# Patient Record
(demographics unavailable — no encounter records)

---

## 2024-10-07 NOTE — PLAN
[FreeTextEntry1] : 1. sickle cell anemia * referral for lab CBC * c/w folic acid 2. hypercholesterolemia  * referral for lab fasting cmp, lipids * low cholesterol, low triglycerides diet, dietary counseling given; dietary avoidance discussed; diet and exercise reviewed with patient * c/w rosuvastatin 20 mg 3. obesity * patient counselled on healthy diet with limited calories, regular exercises, low fat choices, limit or eliminate junk food, and to have family meals as often as possible. * c/w phentermine * f/u in three months

## 2024-10-07 NOTE — HISTORY OF PRESENT ILLNESS
[Home] : at home, [unfilled] , at the time of the visit. [Medical Office: ([facility])___] : at the medical office located in  [Verbal consent obtained from patient] : the patient, [unfilled] [FreeTextEntry1] : follow up Interview and discussion conducted in Danish by Danish speaking physician [de-identified] : Telehealth visit was delivered after patient consent was obtained; patient agrees to discuss health issues via video conference. Risk/limitations involved in medical telehealth visits were reviewed, including limitations of physical exam. Patient was asked for identifiers by stating name and ; patient was asked to place him/her self in private area. Patient understand that an office visit may be indicated after telehealth visit if no symptomatic improvement. Patient should contact us for worsening symptoms or go to ER. 38 years old female with sickle cell trait, hypercholesterolemia; obesity, seen for follow up, she states feeling better, joints pain has improved, feels with more energy; taking phentermine for weight losss, and rosuvastatin; she is due for labs to monitor lipids, cbc; time spent 30 minutes

## 2025-04-28 NOTE — HISTORY OF PRESENT ILLNESS
[de-identified] : 39 years old female presents for annual physical exam, refers multiple joints and muscle pain

## 2025-04-28 NOTE — PLAN
[FreeTextEntry1] : 1. annual physical exam * routine general labs done * age appropriate health maintenance recommendations reviewed, discussed including healthy diet, regular exercise 2. overweight * patient counselled on healthy diet with limited calories, regular exercises, low fat choices, limit or eliminate junk food, and to have family meals as often as possible. 3. hypercholesterolemia * low cholesterol, low triglycerides diet, dietary counseling given; dietary avoidance discussed; diet and exercise reviewed with patient 4. sickle cell trait * c/w folic acid * referral to hematology 5. muscle pain * acetaminophen as needed * f/u in three months

## 2025-04-28 NOTE — HEALTH RISK ASSESSMENT
[Fair] :  ~his/her~ mood as fair [No] : No [No falls in past year] : Patient reported no falls in the past year [0] : 2) Feeling down, depressed, or hopeless: Not at all (0) [PHQ-2 Negative - No further assessment needed] : PHQ-2 Negative - No further assessment needed [Yes] : Reviewed medication list for presence of high-risk medications. [Never] : Never [NO] : No [Patient reported PAP Smear was normal] : Patient reported PAP Smear was normal [HIV test declined] : HIV test declined [Hepatitis C test declined] : Hepatitis C test declined [With Family] : lives with family [Unemployed] : unemployed [College] : College [Significant Other] : lives with significant other [# Of Children ___] : has [unfilled] children [Sexually Active] : sexually active [Feels Safe at Home] : Feels safe at home [Fully functional (bathing, dressing, toileting, transferring, walking, feeding)] : Fully functional (bathing, dressing, toileting, transferring, walking, feeding) [Fully functional (using the telephone, shopping, preparing meals, housekeeping, doing laundry, using] : Fully functional and needs no help or supervision to perform IADLs (using the telephone, shopping, preparing meals, housekeeping, doing laundry, using transportation, managing medications and managing finances) [Smoke Detector] : smoke detector [Carbon Monoxide Detector] : carbon monoxide detector [Seat Belt] :  uses seat belt [[id number]] : 0 [Change in mental status noted] : No change in mental status noted [Reports changes in hearing] : Reports no changes in hearing [Reports changes in vision] : Reports no changes in vision [Reports changes in dental health] : Reports no changes in dental health [PapSmearDate] : 08/2024

## 2025-07-28 NOTE — PLAN
[FreeTextEntry1] : 1. sickle cell trait * lab for CBC * c/w folic acid 2. obesity * patient counselled on healthy diet with limited calories, regular exercises, low fat choices, limit or eliminate junk food, and to have family meals as often as possible. * she had tried and failed numerous diet and exercise, to consider increase dose of phentermine * GLP-1 medication is indicated for weight loss and CV protection, due to comorbid conditions 3. hypercholesterolemia * lab for fasting lipid panel, cmp * low cholesterol, low triglycerides diet, dietary counseling given; dietary avoidance discussed; diet and exercise reviewed with patient * she d/c rosuvastatin due to muscle pain 4. fibroid * GYN referral 5. elevated fasting glucose * lab for a1c, insulin level 6. breast cancer screening * referral for mammogram * has family h/o breast cancer and cervical uterine cancer paternal aunt * f/u in three months

## 2025-07-28 NOTE — HISTORY OF PRESENT ILLNESS
[FreeTextEntry1] : follow up Interview and discussion conducted in Tajik by Tajik speaking physician  [de-identified] : 39 years old female with sickle cell anemia, hypercholesterolemia, obesity presents for follow up; she c/o struggling with weight loss, had been on high protein diet, c/o irregular menstrual periods, had pelvis US done two weeks ago while in [facility] on vacation, found with small fibroid, refers also high elevated blood glycemia in the morning